# Patient Record
Sex: MALE | HISPANIC OR LATINO | URBAN - METROPOLITAN AREA
[De-identification: names, ages, dates, MRNs, and addresses within clinical notes are randomized per-mention and may not be internally consistent; named-entity substitution may affect disease eponyms.]

---

## 2018-06-25 ENCOUNTER — HOSPITAL ENCOUNTER (OUTPATIENT)
Facility: MEDICAL CENTER | Age: 68
End: 2018-06-26
Attending: OPHTHALMOLOGY | Admitting: OPHTHALMOLOGY
Payer: MEDICARE

## 2018-06-25 ENCOUNTER — APPOINTMENT (OUTPATIENT)
Dept: RADIOLOGY | Facility: MEDICAL CENTER | Age: 68
End: 2018-06-25
Attending: EMERGENCY MEDICINE
Payer: MEDICARE

## 2018-06-25 DIAGNOSIS — S05.32XA RUPTURED GLOBE OF LEFT EYE, INITIAL ENCOUNTER: ICD-10-CM

## 2018-06-25 PROCEDURE — 99291 CRITICAL CARE FIRST HOUR: CPT

## 2018-06-25 PROCEDURE — 160029 HCHG SURGERY MINUTES - 1ST 30 MINS LEVEL 4: Performed by: OPHTHALMOLOGY

## 2018-06-25 PROCEDURE — 160035 HCHG PACU - 1ST 60 MINS PHASE I: Performed by: OPHTHALMOLOGY

## 2018-06-25 PROCEDURE — 160009 HCHG ANES TIME/MIN: Performed by: OPHTHALMOLOGY

## 2018-06-25 PROCEDURE — 700101 HCHG RX REV CODE 250

## 2018-06-25 PROCEDURE — 36415 COLL VENOUS BLD VENIPUNCTURE: CPT

## 2018-06-25 PROCEDURE — 93010 ELECTROCARDIOGRAM REPORT: CPT | Performed by: INTERNAL MEDICINE

## 2018-06-25 PROCEDURE — 160036 HCHG PACU - EA ADDL 30 MINS PHASE I: Performed by: OPHTHALMOLOGY

## 2018-06-25 PROCEDURE — 700101 HCHG RX REV CODE 250: Performed by: OPHTHALMOLOGY

## 2018-06-25 PROCEDURE — 501838 HCHG SUTURE GENERAL: Performed by: OPHTHALMOLOGY

## 2018-06-25 PROCEDURE — 700111 HCHG RX REV CODE 636 W/ 250 OVERRIDE (IP)

## 2018-06-25 PROCEDURE — 93005 ELECTROCARDIOGRAM TRACING: CPT | Performed by: ANESTHESIOLOGY

## 2018-06-25 PROCEDURE — 160041 HCHG SURGERY MINUTES - EA ADDL 1 MIN LEVEL 4: Performed by: OPHTHALMOLOGY

## 2018-06-25 PROCEDURE — G0378 HOSPITAL OBSERVATION PER HR: HCPCS

## 2018-06-25 PROCEDURE — 160048 HCHG OR STATISTICAL LEVEL 1-5: Performed by: OPHTHALMOLOGY

## 2018-06-25 PROCEDURE — 160002 HCHG RECOVERY MINUTES (STAT): Performed by: OPHTHALMOLOGY

## 2018-06-25 PROCEDURE — 70480 CT ORBIT/EAR/FOSSA W/O DYE: CPT

## 2018-06-25 RX ORDER — BALANCED SALT SOLUTION 6.4; .75; .48; .3; 3.9; 1.7 MG/ML; MG/ML; MG/ML; MG/ML; MG/ML; MG/ML
SOLUTION OPHTHALMIC
Status: DISCONTINUED | OUTPATIENT
Start: 2018-06-25 | End: 2018-06-25 | Stop reason: HOSPADM

## 2018-06-25 RX ORDER — ONDANSETRON 2 MG/ML
4 INJECTION INTRAMUSCULAR; INTRAVENOUS EVERY 6 HOURS PRN
Status: DISCONTINUED | OUTPATIENT
Start: 2018-06-25 | End: 2018-06-26 | Stop reason: HOSPADM

## 2018-06-25 RX ORDER — PREDNISOLONE ACETATE 10 MG/ML
1 SUSPENSION/ DROPS OPHTHALMIC 4 TIMES DAILY
Status: DISCONTINUED | OUTPATIENT
Start: 2018-06-25 | End: 2018-06-26 | Stop reason: HOSPADM

## 2018-06-25 RX ORDER — MOXIFLOXACIN 5 MG/ML
1 SOLUTION/ DROPS OPHTHALMIC 4 TIMES DAILY
Status: DISCONTINUED | OUTPATIENT
Start: 2018-06-25 | End: 2018-06-26 | Stop reason: HOSPADM

## 2018-06-25 RX ORDER — HYDROCODONE BITARTRATE AND ACETAMINOPHEN 5; 325 MG/1; MG/1
1-2 TABLET ORAL EVERY 4 HOURS PRN
Status: DISCONTINUED | OUTPATIENT
Start: 2018-06-25 | End: 2018-06-26 | Stop reason: HOSPADM

## 2018-06-25 RX ORDER — MORPHINE SULFATE 4 MG/ML
1-3 INJECTION, SOLUTION INTRAMUSCULAR; INTRAVENOUS EVERY 4 HOURS PRN
Status: DISCONTINUED | OUTPATIENT
Start: 2018-06-25 | End: 2018-06-26 | Stop reason: HOSPADM

## 2018-06-25 RX ADMIN — PREDNISOLONE ACETATE 1 DROP: 10 SUSPENSION/ DROPS OPHTHALMIC at 23:58

## 2018-06-25 RX ADMIN — FENTANYL CITRATE 50 MCG: 50 INJECTION, SOLUTION INTRAMUSCULAR; INTRAVENOUS at 21:40

## 2018-06-25 RX ADMIN — MOXIFLOXACIN HYDROCHLORIDE 1 DROP: 5 SOLUTION/ DROPS OPHTHALMIC at 23:55

## 2018-06-25 RX ADMIN — FENTANYL CITRATE 50 MCG: 50 INJECTION, SOLUTION INTRAMUSCULAR; INTRAVENOUS at 21:55

## 2018-06-25 ASSESSMENT — LIFESTYLE VARIABLES
EVER HAD A DRINK FIRST THING IN THE MORNING TO STEADY YOUR NERVES TO GET RID OF A HANGOVER: NO
ON A TYPICAL DAY WHEN YOU DRINK ALCOHOL HOW MANY DRINKS DO YOU HAVE: 1
HAVE PEOPLE ANNOYED YOU BY CRITICIZING YOUR DRINKING: NO
ALCOHOL_USE: NO
TOTAL SCORE: 0
TOTAL SCORE: 0
EVER_SMOKED: YES
ALCOHOL_USE: YES
HOW MANY TIMES IN THE PAST YEAR HAVE YOU HAD 5 OR MORE DRINKS IN A DAY: 0
HAVE YOU EVER FELT YOU SHOULD CUT DOWN ON YOUR DRINKING: NO
EVER FELT BAD OR GUILTY ABOUT YOUR DRINKING: NO
CONSUMPTION TOTAL: NEGATIVE
DO YOU DRINK ALCOHOL: YES
AVERAGE NUMBER OF DAYS PER WEEK YOU HAVE A DRINK CONTAINING ALCOHOL: 4
TOTAL SCORE: 0

## 2018-06-25 ASSESSMENT — PAIN SCALES - GENERAL
PAINLEVEL_OUTOF10: 3
PAINLEVEL_OUTOF10: 0
PAINLEVEL_OUTOF10: 5
PAINLEVEL_OUTOF10: 4

## 2018-06-25 ASSESSMENT — COPD QUESTIONNAIRES
IN THE PAST 12 MONTHS DO YOU DO LESS THAN YOU USED TO BECAUSE OF YOUR BREATHING PROBLEMS: DISAGREE/UNSURE
HAVE YOU SMOKED AT LEAST 100 CIGARETTES IN YOUR ENTIRE LIFE: YES
COPD SCREENING SCORE: 4
DURING THE PAST 4 WEEKS HOW MUCH DID YOU FEEL SHORT OF BREATH: NONE/LITTLE OF THE TIME
DO YOU EVER COUGH UP ANY MUCUS OR PHLEGM?: NO/ONLY WITH OCCASIONAL COLDS OR INFECTIONS

## 2018-06-25 ASSESSMENT — PATIENT HEALTH QUESTIONNAIRE - PHQ9
1. LITTLE INTEREST OR PLEASURE IN DOING THINGS: NOT AT ALL
2. FEELING DOWN, DEPRESSED, IRRITABLE, OR HOPELESS: NOT AT ALL
SUM OF ALL RESPONSES TO PHQ9 QUESTIONS 1 AND 2: 0

## 2018-06-25 NOTE — ED NOTES
[facility] called - Dr Carson will be doing surgery, just needs to have pt admitted.

## 2018-06-25 NOTE — ED TRIAGE NOTES
"Chief Complaint   Patient presents with   • Eye Injury     Sent by [facility] for CT of eye. Dx with ruptured globe with uveal prolapse.     /66   Pulse 80   Temp 37.3 °C (99.1 °F)   Resp 16   Ht 1.651 m (5' 5\")   SpO2 95%     Pt Informed regarding triage process and verbalized understanding to inform triage tech or RN for any changes in condition.  Placed in lobby.    "

## 2018-06-26 VITALS
BODY MASS INDEX: 22 KG/M2 | RESPIRATION RATE: 16 BRPM | HEIGHT: 65 IN | HEART RATE: 66 BPM | OXYGEN SATURATION: 98 % | SYSTOLIC BLOOD PRESSURE: 131 MMHG | WEIGHT: 132.06 LBS | TEMPERATURE: 97.7 F | DIASTOLIC BLOOD PRESSURE: 83 MMHG

## 2018-06-26 LAB — EKG IMPRESSION: NORMAL

## 2018-06-26 PROCEDURE — G0378 HOSPITAL OBSERVATION PER HR: HCPCS

## 2018-06-26 PROCEDURE — 700102 HCHG RX REV CODE 250 W/ 637 OVERRIDE(OP): Performed by: OPHTHALMOLOGY

## 2018-06-26 PROCEDURE — A9270 NON-COVERED ITEM OR SERVICE: HCPCS | Performed by: OPHTHALMOLOGY

## 2018-06-26 PROCEDURE — 99217 PR OBSERVATION CARE DISCHARGE: CPT | Performed by: HOSPITALIST

## 2018-06-26 RX ORDER — HYDROCODONE BITARTRATE AND ACETAMINOPHEN 5; 325 MG/1; MG/1
1 TABLET ORAL EVERY 8 HOURS PRN
Qty: 20 TAB | Refills: 0 | Status: SHIPPED | OUTPATIENT
Start: 2018-06-26 | End: 2018-07-02

## 2018-06-26 RX ORDER — MOXIFLOXACIN 5 MG/ML
1 SOLUTION/ DROPS OPHTHALMIC 4 TIMES DAILY
Qty: 1 BOTTLE | Refills: 0 | Status: SHIPPED | OUTPATIENT
Start: 2018-06-26

## 2018-06-26 RX ORDER — PREDNISOLONE ACETATE 10 MG/ML
1 SUSPENSION/ DROPS OPHTHALMIC 4 TIMES DAILY
Qty: 1 BOTTLE | Refills: 0 | Status: SHIPPED | OUTPATIENT
Start: 2018-06-26

## 2018-06-26 RX ADMIN — PREDNISOLONE ACETATE 1 DROP: 10 SUSPENSION/ DROPS OPHTHALMIC at 18:07

## 2018-06-26 RX ADMIN — MOXIFLOXACIN HYDROCHLORIDE 1 DROP: 5 SOLUTION/ DROPS OPHTHALMIC at 18:07

## 2018-06-26 RX ADMIN — PREDNISOLONE ACETATE 1 DROP: 10 SUSPENSION/ DROPS OPHTHALMIC at 14:16

## 2018-06-26 RX ADMIN — HYDROCODONE BITARTRATE AND ACETAMINOPHEN 1 TABLET: 5; 325 TABLET ORAL at 18:06

## 2018-06-26 RX ADMIN — HYDROCODONE BITARTRATE AND ACETAMINOPHEN 1 TABLET: 5; 325 TABLET ORAL at 03:02

## 2018-06-26 RX ADMIN — MOXIFLOXACIN HYDROCHLORIDE 1 DROP: 5 SOLUTION/ DROPS OPHTHALMIC at 09:23

## 2018-06-26 RX ADMIN — PREDNISOLONE ACETATE 1 DROP: 10 SUSPENSION/ DROPS OPHTHALMIC at 09:24

## 2018-06-26 RX ADMIN — MOXIFLOXACIN HYDROCHLORIDE 1 DROP: 5 SOLUTION/ DROPS OPHTHALMIC at 14:16

## 2018-06-26 ASSESSMENT — PAIN SCALES - GENERAL: PAINLEVEL_OUTOF10: 7

## 2018-06-26 NOTE — PROGRESS NOTES
Patient alert and oriented x 4.  Arrived on unit via gurney.  Ambulated from hallway to bed.  2 RNs skin check completed.  Skin intact. Left eye with shield, edematous  and ecchymotic. Patient stated that he  has no vision in left eye.  Stated that pain was a 2/10.  Tolerated regular diet.  Bed in lowest and locked position and call   Light within reach.

## 2018-06-26 NOTE — OP REPORT
DATE OF SERVICE:  06/25/2018    PREOPERATIVE DIAGNOSES:  1.  Ruptured globe, left eye.  2.  Status post cataract surgery, left eye.  3.  Open angle glaucoma.    POSTOPERATIVE DIAGNOSES:  1.  Ruptured globe, left eye.  2.  Status post cataract surgery, left eye.  3.  Open angle glaucoma.    PROCEDURE:  Repair of ruptured globe, left eye.    SURGEON:  Dedrick Hyatt MD    ANESTHESIA:  General.    COMPLICATIONS:  None.    INDICATIONS:  This is a gentleman who fell sometime yesterday and hit the left   eye on the corner of his bed.  He went back to sleep afterwards and presented   to my office this afternoon where he was found to have a ruptured globe, was   difficult to examine and determine the extent of the injury in the office due   to pain and lack of cooperation.  There was noted to be a rupture around the   temporal limbus and the haptic of an anterior chamber lens was visible, though   the optic was not visible and the lens was presumably partially out of the   wound.  An eye shield was placed and the patient was sent to [facility] for a CT   scan of the orbits and surgery.  CT scan of the orbits did not reveal an acute   fracture.  The globe was mildly deformed and no obvious intraocular lens was   noted.  The patient was brought to the operating room for repair of the   ruptured globe.    DESCRIPTION OF PROCEDURE:  Under general anesthesia, the left eye was gently   and carefully prepped with Betadine.  A drape was placed followed by a lid   speculum with gentle insertion.  Evaluation of the eye found a laceration   along the temporal limbus of 7.5 mm in cord length.  This was presumably where   the original anterior chamber lens was inserted from his cataract surgery   many years ago.  The temporal iris was noted to be missing as well with a   small stub left temporally.  No vitreous was found to present at the wound.    Viscoelastic was injected into the anterior chamber.  The wound was then   closed with 7  interrupted 10-0 nylon sutures with the knots trimmed and   rotated into the corneal tissue.  Viscoelastic was irrigated from the anterior   chamber with BSS and the eye was pressurized.  No leaks were found and the   eye maintained pressure.  The anterior chamber lens was in order to be found   and was presumed to have been extruded from this incision along with the iris   tissue.  The lid speculum was removed and TobraDex ointment was placed on the   eye followed by a shield.  The patient was awakened and extubated per   anesthesia and brought to PACU.       ____________________________________     MD ADRIÁN JETER / FRANCISCO    DD:  06/25/2018 21:22:27  DT:  06/25/2018 21:51:53    D#:  [id number]  Job#:  [id number]

## 2018-06-26 NOTE — ED PROVIDER NOTES
"ED Provider Note    CHIEF COMPLAINT  Chief Complaint   Patient presents with   • Eye Injury       HPI  Favian Cherry is a 68 y.o. male who is sent here from the ophthalmology office, initially evaluated by Dr. Goff, patient fell and struck his eye against a corner of a counter top, he had immediate changes in his vision and was diagnosed with an open globe at the ophthalmology office and was shielded and sent here for evaluation.  The ophthalmologist request a CT of the orbits be obtained and the patient be admitted to the hospital for surgery    REVIEW OF SYSTEMS  Negative for fever, weakness, numbness, neck pain, back pain.    PAST MEDICAL HISTORY       SOCIAL HISTORY  Social History     Social History Main Topics   • Smoking status: Light Tobacco Smoker     Packs/day: 0.25   • Smokeless tobacco: Not on file   • Alcohol use Yes      Comment: >6 drinks daily    • Drug use: Yes      Comment: unknown    • Sexual activity: Not on file       SURGICAL HISTORY  patient denies any surgical history    CURRENT MEDICATIONS  I personally reviewed the medication list in the charting documentation.     ALLERGIES  No Known Allergies    PHYSICAL EXAM  VITAL SIGNS: /66   Pulse 70   Temp 37.3 °C (99.1 °F)   Resp 16   Ht 1.651 m (5' 5\")   SpO2 98%   Constitutional: Alert in no apparent distress.  HENT: No signs of trauma.   Eyes: Left eye is shielded, has some surrounding ecchymosis and edema making exam otherwise difficult.  The right eye has pupillary asymmetry which is chronic with decreased vision in the patient reports this is normal without his glasses.  Chest: Normal nonlabored respirations  Skin: No erythema, No rash.   Musculoskeletal: Good range of motion in all major joints.   Neurologic: Alert, No focal deficits noted.   Psychiatric: Affect normal, Judgment normal.    DIAGNOSTIC STUDIES / PROCEDURES    RADIOLOGY     [id number] W/O   Final Result      Shrunken and heterogeneous appearance of the left " globe, in keeping with globe rupture, may be chronic.      Mild depression of the left medial wall could relate to prior trauma.      Old fracture of the left nasal bone.      No soft tissue swelling or hematoma identified.            COURSE & MEDICAL DECISION MAKING  Pertinent Labs & Imaging studies reviewed. (See chart for details)    Encounter Summary: This is a 68 y.o. male with a left globe rupture, traumatic, sent here by ophthalmology, I discussed the case with the patient's ophthalmologist Dr. Goff, he would like admission under the care of the hospitalist for surgery this evening, CT orbits will be obtained.      DISPOSITION: Admit in guarded condition      FINAL IMPRESSION  1. Ruptured globe of left eye, initial encounter        This dictation was created using voice recognition software. The accuracy of the dictation is limited to the abilities of the software. I expect there may be some errors of grammar and possibly content. The nursing notes were reviewed and certain aspects of this information were incorporated into this note.    Electronically signed by: Serge Umanzor, 6/25/2018 5:42 PM

## 2018-06-26 NOTE — PROGRESS NOTES
Updated son in law Josh via his cell phone. He will meet patient in his room with family or give him a call later.

## 2018-06-26 NOTE — DISCHARGE SUMMARY
Hospital Medicine Discharge Note     Admit Date:  6/25/2018       Discharge Date:   6/26/2018    Attending Physician:  Timo Jaimes     Diagnoses (includes active and resolved):   Active Problems:    Ruptured globe of left eye POA: Yes  Resolved Problems:    * No resolved hospital problems. *      Hospital Summary (Brief Narrative):       68 y.o. male was sent here from the ophthalmology office, initially evaluated by Dr. Goff. He had previously fallen and struck his eye against a corner of a counter top, w/ immediate changes in his vision and was diagnosed with an open globe at the ophthalmology office and was shielded and sent here for evaluation.  He was admitted and had workup with CT of the eye. He was also placed on eyedrops per optho request and direction. He was then taken to the OR for repair of his ruptured globe. After this, his vision began improving. Optho then discussed with patient that it was okay for him to DC home with close outpatient follow-up. The patient was able be discharged home with the drops for him to take outpatient.   Narcotic history was researched for this patient. The last fill was in 1/2017.     Consultants:      Ophthalmology Dr. Hyatt    Procedures:        POSTOPERATIVE DIAGNOSES:  1.  Ruptured globe, left eye.  2.  Status post cataract surgery, left eye.  3.  Open angle glaucoma.     PROCEDURE:  Repair of ruptured globe, left eye.    Discharge Medications:           Medication List      START taking these medications      Instructions   HYDROcodone-acetaminophen 5-325 MG Tabs per tablet  Commonly known as:  NORCO   Take 1 Tab by mouth every 8 hours as needed (vascular procedure) for up to 6 days.  Dose:  1 Tab     moxifloxacin 0.5 % Soln  Commonly known as:  VIGAMOX   Place 1 Drop in left eye 4 times a day.  Dose:  1 Drop     prednisoLONE acetate 1 % Susp  Commonly known as:  PRED FORTE   Place 1 Drop in left eye 4 times a day.  Dose:  1 Drop          Disposition:   Discharge  home    Diet:   Regular    Activity:   As tolerated    Code status:   Unspecified    Primary Care Provider:    No primary care provider on file.    Follow up appointment details :      Dedrick Hyatt M.D.  [address]  [place] [address] [phone]    Go in 1 day  For follow up; [address], [address], [place] [address]        Go to      No future appointments.    Pending Studies:        None    #################################################    Interval history/exam for day of discharge:    Vitals:    06/25/18 2215 06/25/18 2250 06/26/18 0320 06/26/18 0740   BP:  128/78 111/70 131/83   Pulse: 87 83 68 66   Resp: 15 16 16 16   Temp:  36.7 °C (98 °F) 36.8 °C (98.2 °F) 36.5 °C (97.7 °F)   SpO2: 97% 91% 98% 98%   Weight:  59.9 kg (132 lb 0.9 oz)     Height:         Weight/BMI: Body mass index is 21.98 kg/m².  Pulse Oximetry: 98 %, O2 (LPM): 0, O2 Delivery: None (Room Air)    General:  NAD  CVS:  RRR  PULM:  CTAB, no respiratory distress    Most Recent Labs:    No results found for: WBC, RBC, HEMOGLOBIN, HEMATOCRIT, MCV, MCH, MCHC, MPV, NEUTSPOLYS, LYMPHOCYTES, MONOCYTES, EOSINOPHILS, BASOPHILS, HYPOCHROMIA, ANISOCYTOSIS   No results found for: SODIUM, POTASSIUM, CHLORIDE, CO2, GLUCOSE, BUN, CREATININE, BUNCREATRAT, GLOMRATE   No results found for: ALTSGPT, ASTSGOT, ALKPHOSPHAT, TBILIRUBIN, DBILIRUBIN, LIPASE, ALBUMIN, GLOBULIN, PREALBUMIN, INR, MACROCYTOSIS  No results found for: PROTHROMBTM, INR     Imaging/ Testing:      [id number] W/O   Final Result      Shrunken and heterogeneous appearance of the left globe, in keeping with globe rupture, may be chronic.      Mild depression of the left medial wall could relate to prior trauma.      Old fracture of the left nasal bone.      No soft tissue swelling or hematoma identified.          Instructions:      The patient was instructed to return to the ER in the event of worsening symptoms. I have counseled the patient on the importance of  compliance and the patient has agreed to proceed with all medical recommendations and follow up plan indicated above.   The patient understands that all medications come with benefits and risks. Risks may include permanent injury or death and these risks can be minimized with close reassessment and monitoring.

## 2018-06-26 NOTE — CARE PLAN
Problem: Communication  Goal: The ability to communicate needs accurately and effectively will improve    Intervention: Use communication aids and/or /Language Line as appropriate  For all care done on patient/communications,  is used.      Problem: Safety  Goal: Will remain free from injury    Intervention: Provide assistance with mobility  Educated patient to call for assistance mobilizing

## 2018-06-26 NOTE — PROGRESS NOTES
Report received from night RN, assumed Care.   Patient is AOx4, responds appropriately.      Pain controlled at this time. Left eye is covered with eye shield. Left eye is edematous and ecchymotic. Patient states he can see better out of his eye today.  Patient is tolerating diet, denies nausea/vomiting. + flatus  Up self with steady gait.      Plan of care discussed, all questions answered. Patient needs to follow up with doctor after discharge for follow up on left eye.   Explained importance of calling before getting OOB and pt verbalizes understanding.      Call light and belongings within reach, treaded slipper socks on,  SCD in use, bed in lowest locked position.  Hourly rounding in place, all needs met at this time

## 2018-06-27 PROBLEM — S05.32XA RUPTURED GLOBE OF LEFT EYE: Status: ACTIVE | Noted: 2018-06-27

## 2018-06-27 NOTE — PROGRESS NOTES
Pt's family arrived to unit to  pt. Pt discharged with the proper paperwork and prescriptions. All questions answered at this time. Pt walked off unit with both relatives.

## 2018-06-27 NOTE — DISCHARGE INSTRUCTIONS
Discharge Instructions    Discharged to home by car with relative. Discharged via wheelchair, hospital escort: Yes.  Special equipment needed: Not Applicable    Be sure to schedule a follow-up appointment with your primary care doctor or any specialists as instructed.     Discharge Plan:   Smoking Cessation Offered: Patient Counseled  Influenza Vaccine Indication: Patient Refuses    I understand that a diet low in cholesterol, fat, and sodium is recommended for good health. Unless I have been given specific instructions below for another diet, I accept this instruction as my diet prescription.   Other diet: regular    Special Instructions: None    · Is patient discharged on Warfarin / Coumadin?   No     Depression / Suicide Risk    As you are discharged from this [facility] facility, it is important to learn how to keep safe from harming yourself.    Recognize the warning signs:  · Abrupt changes in personality, positive or negative- including increase in energy   · Giving away possessions  · Change in eating patterns- significant weight changes-  positive or negative  · Change in sleeping patterns- unable to sleep or sleeping all the time   · Unwillingness or inability to communicate  · Depression  · Unusual sadness, discouragement and loneliness  · Talk of wanting to die  · Neglect of personal appearance   · Rebelliousness- reckless behavior  · Withdrawal from people/activities they love  · Confusion- inability to concentrate     If you or a loved one observes any of these behaviors or has concerns about self-harm, here's what you can do:  · Talk about it- your feelings and reasons for harming yourself  · Remove any means that you might use to hurt yourself (examples: pills, rope, extension cords, firearm)  · Get professional help from the community (Mental Health, Substance Abuse, psychological counseling)  · Do not be alone:Call your Safe Contact- someone whom you trust who will be there for you.  · Call your  local CRISIS HOTLINE 667-1645 or 784-231-2693  · Call your local Children's Mobile Crisis Response Team [place] (605) 251-4087 or www.Tilt  · Call the toll free National Suicide Prevention Hotlines   · National Suicide Prevention Lifeline 126-522-WDQH (1949)  · [employer] Line Network 800-SUICIDE (535-2191)    Ruptura del globo ocular  (Ruptured Globe)  La ruptura del globo ocular sucede cuando la pared del sylvia se abre a causa de sebastián lesión. Puede ocurrir cuando algo afilado perfora el globo ocular o cuando el globo ocular es golpeado con tanta fuerza que se rompe. La ruptura del globo ocular es sebastián emergencia médica.  CAUSAS  Esta lesión puede producirse por cualquier accidente en el que un objeto contundente golpea el sylvia o bee golpea contra un objeto braulio. Puede ocurrir después de lo siguiente:  · Recibir un golpe en el sylvia con un objeto punzante, marcell un claudia, un lápiz o tijeras.  · Ser golpeado directamente por un objeto contundente, marcell un domo de béisbol, sebastián pelota o un puño.  · Recibir un golpe en el sylvia con un objeto volador. Las lesiones por objetos voladores pueden ocurrir en el trabajo, karen actividades de arreglos domésticos o en un evento deportivo.  · Golpearse el manav con un objeto braulio, marcell con el tablero del auto.  · Caerse y golpearse el sylvia.  SÍNTOMAS  Los síntomas de esta afección incluyen lo siguiente:  · Dolor.  · Pérdida de la visión.  · Disminución de la capacidad de  el sylvia normalmente.  · Hay un drenaje de donnie o líquido del sylvia.  DIAGNÓSTICO  Esta afección se diagnostica mediante michele antecedentes médicos y un examen físico. También pueden hacerle estudios de diagnóstico por imágenes, entre ellos:  · Tomografía computarizada (TC).  · Radiografía.  · Resonancia magnética (RM).  · Ecografía.  TRATAMIENTO  El tratamiento para esta lesión es generalmente quirúrgico y lo realiza un cirujano ocular (oftalmólogo). El tratamiento inicial antes de que se lleva a  cabo la cirugía, puede incluir:  · Mantener el sylvia cubierto con sebastián protección para evitar otra lesión o que se siga lastimando.  · Medicamentos para evitar los vómitos. Los vómitos pueden aumentar la presión en el sylvia y causar más lesiones.  · Analgésicos y medicamentos para relajarse (sedantes).  · Le recetarán antibióticos para prevenir o tratar la infección.  · Reposo en cama.  · Vacuna antitetánica.  Esta información no tiene marcell fin reemplazar el consejo del médico. Asegúrese de hacerle al médico cualquier pregunta que tenga.  Document Released: 06/18/2013 Document Revised: 11/14/2016 Document Reviewed: 11/14/2016  [employer] Interactive Patient Education © 2017 Elsevier Inc.    Cómo usar las gotas y las pomadas oftálmicas  (How to Use Eye Drops and Eye Ointments)  CÓMO APLICAR LAS GOTAS OFTÁLMICAS  Siga estos pasos cuando se ponga gotas oftálmicas:  Lávese las refugio.  Incline la aye hacia atrás.  Ponga un dedo debajo del sylvia y úselo para bajar suavemente el párpado inferior. Deje el dedo en el lugar.  Con la otra mano, sostenga el gotero entre el pulgar y el índice.  Coloque el gotero stephanie por encima del borde del párpado inferior. Sosténgalo tan cerca marcell pueda del sylvia sin apoyarlo sobre bee.  Mantenga firme la mano. Sebastián forma de hacerlo es apoyar el índice contra la colmenares.  Maryan hacia arriba.  Lenta y suavemente apriete para que caiga sebastián gota del medicamento en el sylvia.  Cierre el sylvia.  Coloque un dedo entre el párpado inferior y la nariz. Presione con suavidad karen 2 minutos. [place] aumenta el tiempo que el medicamento está en contacto con el sylvia. También reduce los efectos secundarios que pueden aparecer si la gota llega al torrente sanguíneo a través de la nariz.  CÓMO APLICAR LAS POMADAS OFTÁLMICAS  Siga estos pasos cuando se aplique pomadas oftálmicas:  Lávese las refugio.  Ponga un dedo debajo del sylvia y úselo para bajar suavemente el párpado inferior. Deje el dedo en el lugar.  Con la otra mano,  coloque la punta del tubo entre el pulgar y el índice con el humberto de los dedos apoyados sobre la mejilla o la nariz.  Sostenga el tubo stephanie por encima del borde del párpado inferior sin apoyarlo sobre el párpado ni el globo ocular.  Maryan hacia arriba.  Aplique la pomada a lo ajit de la parte interna del párpado inferior.  Con suavidad, tire el párpado superior hacia arriba y maryan hacia abajo. [place] hará que la pomada se mae sobre la superficie del sylvia.  Suelte el párpado superior.  Si puede, cierre los ojos karen 1 o 2 minutos.  No se frote los ojos. Si aplicó correctamente la pomada, tendrá la visión borrosa karen algunos minutos. [place] es normal.  INFORMACIÓN ADICIONAL  Use las gotas o la pomada oftálmica marcell se lo haya indicado el médico.  Si le indicaron que use gotas y sebastián pomada oftálmica, aplique jose cruz las gotas; luego espere entre 3 y 4 minutos antes de colocarse la pomada.  Intente no apoyar la punta del gotero o del tubo en el sylvia. Un gotero o un tubo que hayan estado en contacto con el sylvia pueden contaminarse.  Esta información no tiene marcell fin reemplazar el consejo del médico. Asegúrese de hacerle al médico cualquier pregunta que tenga.  Document Released: 12/18/2006 Document Revised: 05/03/2016 Document Reviewed: 12/14/2015  Elsevier Interactive Patient Education © 2017 Elsevier Inc.

## 2018-06-27 NOTE — PROGRESS NOTES
This RN has reviewed and approved/edited all of Preceptee Ирина Jaimes RN charting.  Discussed charting changes with preceptee

## 2018-06-27 NOTE — PROGRESS NOTES
Patient to be discharged home with daughter when she arrives.    IV removed prior to discharge.   Signed prescriptions given to patient.  Discharge education provided, all questions answered using .   Verbalized understanding of discharge education.

## (undated) DEVICE — SPEAR EYE SPNG 3ANG MLBL HNDL - (10/ST18ST/PK 180/PK 1PK/SP)

## (undated) DEVICE — CORDS BIPOLAR COAGULATION - 12FT STERILE DISP. (10EA/BX)

## (undated) DEVICE — Device

## (undated) DEVICE — PROTECTOR ULNA NERVE - (36PR/CA)

## (undated) DEVICE — BLANKET WARMING LOWER BODY - (10/CA) INACTIVE USE #8585

## (undated) DEVICE — SODIUM CHL IRRIGATION 0.9% 1000ML (12EA/CA)

## (undated) DEVICE — MASK ANESTHESIA ADULT  - (100/CA)

## (undated) DEVICE — SET EXTENSION WITH 2 PORTS (48EA/CA) ***PART #2C8610 IS A SUBSTITUTE*****

## (undated) DEVICE — HEAD HOLDER JUNIOR/ADULT

## (undated) DEVICE — DETERGENT RENUZYME PLUS 10 OZ PACKET (50/BX)

## (undated) DEVICE — ELECTRODE 850 FOAM ADHESIVE - HYDROGEL RADIOTRNSPRNT (50/PK)

## (undated) DEVICE — TUBING CLEARLINK DUO-VENT - C-FLO (48EA/CA)

## (undated) DEVICE — GLOVE SZ 7.5 BIOGEL PI MICRO - PF LF (50PR/BX)

## (undated) DEVICE — NEPTUNE 4 PORT MANIFOLD - (20/PK)

## (undated) DEVICE — KNIFE MICROSURGICAL 15 DEGREE GREEN

## (undated) DEVICE — KIT ANESTHESIA W/CIRCUIT & 3/LT BAG W/FILTER (20EA/CA)

## (undated) DEVICE — SUCTION INSTRUMENT YANKAUER BULBOUS TIP W/O VENT (50EA/CA)

## (undated) DEVICE — LACTATED RINGERS INJ 1000 ML - (14EA/CA 60CA/PF)

## (undated) DEVICE — SUTURE GENERAL

## (undated) DEVICE — SENSOR SPO2 NEO LNCS ADHESIVE (20/BX) SEE USER NOTES

## (undated) DEVICE — TUBE E-T HI-LO CUFF 7.5MM (10EA/PK)

## (undated) DEVICE — KIT ROOM DECONTAMINATION

## (undated) DEVICE — WATER IRRIGATION STERILE 1000ML (12EA/CA)

## (undated) DEVICE — CANISTER SUCTION 3000ML MECHANICAL FILTER AUTO SHUTOFF MEDI-VAC NONSTERILE LF DISP  (40EA/CA)

## (undated) DEVICE — SET LEADWIRE 5 LEAD BEDSIDE DISPOSABLE ECG (1SET OF 5/EA)

## (undated) DEVICE — GLOVE BIOGEL PI INDICATOR SZ 7.0 SURGICAL PF LF - (50/BX 4BX/CA)

## (undated) DEVICE — SUTURE 10-0 NYLON CU-8 (12PK/BX)